# Patient Record
Sex: MALE | Race: WHITE | Employment: FULL TIME | URBAN - METROPOLITAN AREA
[De-identification: names, ages, dates, MRNs, and addresses within clinical notes are randomized per-mention and may not be internally consistent; named-entity substitution may affect disease eponyms.]

---

## 2017-09-30 PROCEDURE — 81001 URINALYSIS AUTO W/SCOPE: CPT | Performed by: FAMILY MEDICINE

## 2017-10-02 PROBLEM — R31.29 MICROSCOPIC HEMATURIA: Status: ACTIVE | Noted: 2017-10-02

## 2018-10-30 PROBLEM — F90.0 ADHD, PREDOMINANTLY INATTENTIVE TYPE: Status: ACTIVE | Noted: 2018-10-30

## 2019-04-03 PROBLEM — M18.12 PRIMARY OSTEOARTHRITIS OF FIRST CARPOMETACARPAL JOINT OF LEFT HAND: Status: ACTIVE | Noted: 2019-04-03

## 2021-10-25 PROBLEM — R31.29 MICROSCOPIC HEMATURIA: Status: RESOLVED | Noted: 2017-10-02 | Resolved: 2021-10-25

## 2021-10-25 PROBLEM — M18.12 PRIMARY OSTEOARTHRITIS OF FIRST CARPOMETACARPAL JOINT OF LEFT HAND: Status: RESOLVED | Noted: 2019-04-03 | Resolved: 2021-10-25

## 2024-02-10 ENCOUNTER — ANESTHESIA (OUTPATIENT)
Dept: SURGERY | Facility: HOSPITAL | Age: 49
End: 2024-02-10
Payer: COMMERCIAL

## 2024-02-10 ENCOUNTER — APPOINTMENT (OUTPATIENT)
Dept: CT IMAGING | Facility: HOSPITAL | Age: 49
End: 2024-02-10
Attending: EMERGENCY MEDICINE
Payer: COMMERCIAL

## 2024-02-10 ENCOUNTER — HOSPITAL ENCOUNTER (INPATIENT)
Facility: HOSPITAL | Age: 49
LOS: 1 days | Discharge: HOME | End: 2024-02-11
Attending: EMERGENCY MEDICINE | Admitting: INTERNAL MEDICINE
Payer: COMMERCIAL

## 2024-02-10 ENCOUNTER — ANESTHESIA EVENT (OUTPATIENT)
Dept: SURGERY | Facility: HOSPITAL | Age: 49
End: 2024-02-10
Payer: COMMERCIAL

## 2024-02-10 DIAGNOSIS — K81.0 ACUTE CHOLECYSTITIS: Primary | ICD-10-CM

## 2024-02-10 DIAGNOSIS — K81.9 CHOLECYSTITIS: ICD-10-CM

## 2024-02-10 LAB
ALBUMIN SERPL-MCNC: 3.7 G/DL (ref 3.4–5)
ALBUMIN/GLOB SERPL: 1 {RATIO} (ref 1–2)
ALP LIVER SERPL-CCNC: 85 U/L
ALT SERPL-CCNC: 17 U/L
ANION GAP SERPL CALC-SCNC: 5 MMOL/L (ref 0–18)
AST SERPL-CCNC: 12 U/L (ref 15–37)
BASOPHILS # BLD AUTO: 0.04 X10(3) UL (ref 0–0.2)
BASOPHILS NFR BLD AUTO: 0.3 %
BILIRUB SERPL-MCNC: 1.3 MG/DL (ref 0.1–2)
BILIRUB UR QL STRIP.AUTO: NEGATIVE
BUN BLD-MCNC: 13 MG/DL (ref 9–23)
CALCIUM BLD-MCNC: 9.1 MG/DL (ref 8.5–10.1)
CHLORIDE SERPL-SCNC: 106 MMOL/L (ref 98–112)
CLARITY UR REFRACT.AUTO: CLEAR
CO2 SERPL-SCNC: 29 MMOL/L (ref 21–32)
COLOR UR AUTO: YELLOW
CREAT BLD-MCNC: 0.79 MG/DL
EGFRCR SERPLBLD CKD-EPI 2021: 110 ML/MIN/1.73M2 (ref 60–?)
EOSINOPHIL # BLD AUTO: 0.06 X10(3) UL (ref 0–0.7)
EOSINOPHIL NFR BLD AUTO: 0.4 %
ERYTHROCYTE [DISTWIDTH] IN BLOOD BY AUTOMATED COUNT: 12.8 %
GLOBULIN PLAS-MCNC: 3.6 G/DL (ref 2.8–4.4)
GLUCOSE BLD-MCNC: 98 MG/DL (ref 70–99)
GLUCOSE UR STRIP.AUTO-MCNC: NORMAL MG/DL
HCT VFR BLD AUTO: 44.4 %
HGB BLD-MCNC: 14.7 G/DL
IMM GRANULOCYTES # BLD AUTO: 0.05 X10(3) UL (ref 0–1)
IMM GRANULOCYTES NFR BLD: 0.4 %
KETONES UR STRIP.AUTO-MCNC: NEGATIVE MG/DL
LEUKOCYTE ESTERASE UR QL STRIP.AUTO: NEGATIVE
LIPASE SERPL-CCNC: 34 U/L (ref 13–75)
LYMPHOCYTES # BLD AUTO: 1.69 X10(3) UL (ref 1–4)
LYMPHOCYTES NFR BLD AUTO: 12.6 %
MCH RBC QN AUTO: 27.7 PG (ref 26–34)
MCHC RBC AUTO-ENTMCNC: 33.1 G/DL (ref 31–37)
MCV RBC AUTO: 83.8 FL
MONOCYTES # BLD AUTO: 1.4 X10(3) UL (ref 0.1–1)
MONOCYTES NFR BLD AUTO: 10.4 %
NEUTROPHILS # BLD AUTO: 10.18 X10 (3) UL (ref 1.5–7.7)
NEUTROPHILS # BLD AUTO: 10.18 X10(3) UL (ref 1.5–7.7)
NEUTROPHILS NFR BLD AUTO: 75.9 %
NITRITE UR QL STRIP.AUTO: NEGATIVE
OSMOLALITY SERPL CALC.SUM OF ELEC: 290 MOSM/KG (ref 275–295)
PH UR STRIP.AUTO: 6 [PH] (ref 5–8)
PLATELET # BLD AUTO: 374 10(3)UL (ref 150–450)
POTASSIUM SERPL-SCNC: 3.8 MMOL/L (ref 3.5–5.1)
PROT SERPL-MCNC: 7.3 G/DL (ref 6.4–8.2)
PROT UR STRIP.AUTO-MCNC: 30 MG/DL
RBC # BLD AUTO: 5.3 X10(6)UL
RBC #/AREA URNS AUTO: >10 /HPF
SODIUM SERPL-SCNC: 140 MMOL/L (ref 136–145)
SP GR UR STRIP.AUTO: 1.02 (ref 1–1.03)
UROBILINOGEN UR STRIP.AUTO-MCNC: 2 MG/DL
WBC # BLD AUTO: 13.4 X10(3) UL (ref 4–11)

## 2024-02-10 PROCEDURE — 0FT44ZZ RESECTION OF GALLBLADDER, PERCUTANEOUS ENDOSCOPIC APPROACH: ICD-10-PCS | Performed by: SURGERY

## 2024-02-10 PROCEDURE — 88341 IMHCHEM/IMCYTCHM EA ADD ANTB: CPT | Performed by: SURGERY

## 2024-02-10 PROCEDURE — 81001 URINALYSIS AUTO W/SCOPE: CPT | Performed by: EMERGENCY MEDICINE

## 2024-02-10 PROCEDURE — 96361 HYDRATE IV INFUSION ADD-ON: CPT

## 2024-02-10 PROCEDURE — 99285 EMERGENCY DEPT VISIT HI MDM: CPT

## 2024-02-10 PROCEDURE — 80053 COMPREHEN METABOLIC PANEL: CPT | Performed by: EMERGENCY MEDICINE

## 2024-02-10 PROCEDURE — 88304 TISSUE EXAM BY PATHOLOGIST: CPT | Performed by: SURGERY

## 2024-02-10 PROCEDURE — 74177 CT ABD & PELVIS W/CONTRAST: CPT | Performed by: EMERGENCY MEDICINE

## 2024-02-10 PROCEDURE — 96365 THER/PROPH/DIAG IV INF INIT: CPT

## 2024-02-10 PROCEDURE — 85025 COMPLETE CBC W/AUTO DIFF WBC: CPT | Performed by: EMERGENCY MEDICINE

## 2024-02-10 PROCEDURE — 8E0W4CZ ROBOTIC ASSISTED PROCEDURE OF TRUNK REGION, PERCUTANEOUS ENDOSCOPIC APPROACH: ICD-10-PCS | Performed by: SURGERY

## 2024-02-10 PROCEDURE — 83690 ASSAY OF LIPASE: CPT | Performed by: EMERGENCY MEDICINE

## 2024-02-10 PROCEDURE — 88342 IMHCHEM/IMCYTCHM 1ST ANTB: CPT | Performed by: SURGERY

## 2024-02-10 PROCEDURE — 96375 TX/PRO/DX INJ NEW DRUG ADDON: CPT

## 2024-02-10 RX ORDER — SODIUM CHLORIDE 9 MG/ML
INJECTION, SOLUTION INTRAVENOUS CONTINUOUS
Status: ACTIVE | OUTPATIENT
Start: 2024-02-10 | End: 2024-02-10

## 2024-02-10 RX ORDER — IBUPROFEN 400 MG/1
400 TABLET ORAL EVERY 6 HOURS PRN
COMMUNITY

## 2024-02-10 RX ORDER — KETOROLAC TROMETHAMINE 30 MG/ML
INJECTION, SOLUTION INTRAMUSCULAR; INTRAVENOUS AS NEEDED
Status: DISCONTINUED | OUTPATIENT
Start: 2024-02-10 | End: 2024-02-10 | Stop reason: SURG

## 2024-02-10 RX ORDER — MIDAZOLAM HYDROCHLORIDE 1 MG/ML
INJECTION INTRAMUSCULAR; INTRAVENOUS AS NEEDED
Status: DISCONTINUED | OUTPATIENT
Start: 2024-02-10 | End: 2024-02-10 | Stop reason: SURG

## 2024-02-10 RX ORDER — INDOCYANINE GREEN AND WATER 25 MG
5 KIT INJECTION ONCE
Status: COMPLETED | OUTPATIENT
Start: 2024-02-10 | End: 2024-02-10

## 2024-02-10 RX ORDER — DEXAMETHASONE SODIUM PHOSPHATE 4 MG/ML
VIAL (ML) INJECTION AS NEEDED
Status: DISCONTINUED | OUTPATIENT
Start: 2024-02-10 | End: 2024-02-10 | Stop reason: SURG

## 2024-02-10 RX ORDER — NALOXONE HYDROCHLORIDE 0.4 MG/ML
0.08 INJECTION, SOLUTION INTRAMUSCULAR; INTRAVENOUS; SUBCUTANEOUS AS NEEDED
Status: DISCONTINUED | OUTPATIENT
Start: 2024-02-10 | End: 2024-02-10 | Stop reason: HOSPADM

## 2024-02-10 RX ORDER — TRANEXAMIC ACID 10 MG/ML
INJECTION, SOLUTION INTRAVENOUS AS NEEDED
Status: DISCONTINUED | OUTPATIENT
Start: 2024-02-10 | End: 2024-02-10 | Stop reason: SURG

## 2024-02-10 RX ORDER — LIDOCAINE HYDROCHLORIDE 10 MG/ML
INJECTION, SOLUTION INFILTRATION; PERINEURAL AS NEEDED
Status: DISCONTINUED | OUTPATIENT
Start: 2024-02-10 | End: 2024-02-10 | Stop reason: HOSPADM

## 2024-02-10 RX ORDER — BUPIVACAINE HYDROCHLORIDE 5 MG/ML
INJECTION, SOLUTION EPIDURAL; INTRACAUDAL AS NEEDED
Status: DISCONTINUED | OUTPATIENT
Start: 2024-02-10 | End: 2024-02-10 | Stop reason: HOSPADM

## 2024-02-10 RX ORDER — ALBUTEROL SULFATE 2.5 MG/3ML
2.5 SOLUTION RESPIRATORY (INHALATION) AS NEEDED
Status: DISCONTINUED | OUTPATIENT
Start: 2024-02-10 | End: 2024-02-10 | Stop reason: HOSPADM

## 2024-02-10 RX ORDER — SODIUM CHLORIDE, SODIUM LACTATE, POTASSIUM CHLORIDE, CALCIUM CHLORIDE 600; 310; 30; 20 MG/100ML; MG/100ML; MG/100ML; MG/100ML
INJECTION, SOLUTION INTRAVENOUS CONTINUOUS
Status: DISCONTINUED | OUTPATIENT
Start: 2024-02-10 | End: 2024-02-10 | Stop reason: HOSPADM

## 2024-02-10 RX ORDER — LIDOCAINE HYDROCHLORIDE 10 MG/ML
INJECTION, SOLUTION EPIDURAL; INFILTRATION; INTRACAUDAL; PERINEURAL AS NEEDED
Status: DISCONTINUED | OUTPATIENT
Start: 2024-02-10 | End: 2024-02-10 | Stop reason: SURG

## 2024-02-10 RX ORDER — HYDROMORPHONE HYDROCHLORIDE 1 MG/ML
0.2 INJECTION, SOLUTION INTRAMUSCULAR; INTRAVENOUS; SUBCUTANEOUS EVERY 5 MIN PRN
Status: DISCONTINUED | OUTPATIENT
Start: 2024-02-10 | End: 2024-02-10 | Stop reason: HOSPADM

## 2024-02-10 RX ORDER — POLYETHYLENE GLYCOL 3350 17 G/17G
17 POWDER, FOR SOLUTION ORAL DAILY PRN
Status: DISCONTINUED | OUTPATIENT
Start: 2024-02-10 | End: 2024-02-11

## 2024-02-10 RX ORDER — HYDROCODONE BITARTRATE AND ACETAMINOPHEN 5; 325 MG/1; MG/1
2 TABLET ORAL ONCE AS NEEDED
Status: DISCONTINUED | OUTPATIENT
Start: 2024-02-10 | End: 2024-02-10 | Stop reason: HOSPADM

## 2024-02-10 RX ORDER — HYDROMORPHONE HYDROCHLORIDE 1 MG/ML
0.4 INJECTION, SOLUTION INTRAMUSCULAR; INTRAVENOUS; SUBCUTANEOUS EVERY 5 MIN PRN
Status: DISCONTINUED | OUTPATIENT
Start: 2024-02-10 | End: 2024-02-10 | Stop reason: HOSPADM

## 2024-02-10 RX ORDER — HYDROMORPHONE HYDROCHLORIDE 1 MG/ML
0.6 INJECTION, SOLUTION INTRAMUSCULAR; INTRAVENOUS; SUBCUTANEOUS EVERY 5 MIN PRN
Status: DISCONTINUED | OUTPATIENT
Start: 2024-02-10 | End: 2024-02-10 | Stop reason: HOSPADM

## 2024-02-10 RX ORDER — BUPROPION HYDROCHLORIDE 300 MG/1
300 TABLET ORAL DAILY
COMMUNITY
Start: 2024-02-03

## 2024-02-10 RX ORDER — PHENYLEPHRINE HCL 10 MG/ML
VIAL (ML) INJECTION AS NEEDED
Status: DISCONTINUED | OUTPATIENT
Start: 2024-02-10 | End: 2024-02-10 | Stop reason: SURG

## 2024-02-10 RX ORDER — ONDANSETRON 2 MG/ML
4 INJECTION INTRAMUSCULAR; INTRAVENOUS EVERY 6 HOURS PRN
Status: DISCONTINUED | OUTPATIENT
Start: 2024-02-10 | End: 2024-02-10 | Stop reason: HOSPADM

## 2024-02-10 RX ORDER — SODIUM CHLORIDE 9 MG/ML
1000 INJECTION, SOLUTION INTRAVENOUS ONCE
Status: COMPLETED | OUTPATIENT
Start: 2024-02-10 | End: 2024-02-10

## 2024-02-10 RX ORDER — SERTRALINE HYDROCHLORIDE 100 MG/1
100 TABLET, FILM COATED ORAL NIGHTLY
COMMUNITY
Start: 2023-12-26

## 2024-02-10 RX ORDER — ONDANSETRON 2 MG/ML
INJECTION INTRAMUSCULAR; INTRAVENOUS AS NEEDED
Status: DISCONTINUED | OUTPATIENT
Start: 2024-02-10 | End: 2024-02-10 | Stop reason: SURG

## 2024-02-10 RX ORDER — ROCURONIUM BROMIDE 10 MG/ML
INJECTION, SOLUTION INTRAVENOUS AS NEEDED
Status: DISCONTINUED | OUTPATIENT
Start: 2024-02-10 | End: 2024-02-10 | Stop reason: SURG

## 2024-02-10 RX ORDER — ONDANSETRON 2 MG/ML
4 INJECTION INTRAMUSCULAR; INTRAVENOUS EVERY 6 HOURS PRN
Status: DISCONTINUED | OUTPATIENT
Start: 2024-02-10 | End: 2024-02-11

## 2024-02-10 RX ORDER — ONDANSETRON 2 MG/ML
4 INJECTION INTRAMUSCULAR; INTRAVENOUS EVERY 4 HOURS PRN
Status: DISCONTINUED | OUTPATIENT
Start: 2024-02-10 | End: 2024-02-10

## 2024-02-10 RX ORDER — ENEMA 19; 7 G/133ML; G/133ML
1 ENEMA RECTAL ONCE AS NEEDED
Status: DISCONTINUED | OUTPATIENT
Start: 2024-02-10 | End: 2024-02-11

## 2024-02-10 RX ORDER — LABETALOL HYDROCHLORIDE 5 MG/ML
5 INJECTION, SOLUTION INTRAVENOUS EVERY 5 MIN PRN
Status: DISCONTINUED | OUTPATIENT
Start: 2024-02-10 | End: 2024-02-10 | Stop reason: HOSPADM

## 2024-02-10 RX ORDER — KETOROLAC TROMETHAMINE 30 MG/ML
30 INJECTION, SOLUTION INTRAMUSCULAR; INTRAVENOUS ONCE
Status: COMPLETED | OUTPATIENT
Start: 2024-02-10 | End: 2024-02-10

## 2024-02-10 RX ORDER — DEXTROSE MONOHYDRATE, SODIUM CHLORIDE, AND POTASSIUM CHLORIDE 50; 1.49; 4.5 G/1000ML; G/1000ML; G/1000ML
INJECTION, SOLUTION INTRAVENOUS CONTINUOUS
Status: DISCONTINUED | OUTPATIENT
Start: 2024-02-10 | End: 2024-02-11

## 2024-02-10 RX ORDER — KETOROLAC TROMETHAMINE 30 MG/ML
30 INJECTION, SOLUTION INTRAMUSCULAR; INTRAVENOUS EVERY 6 HOURS PRN
Status: DISCONTINUED | OUTPATIENT
Start: 2024-02-10 | End: 2024-02-11

## 2024-02-10 RX ORDER — SENNOSIDES 8.6 MG
17.2 TABLET ORAL NIGHTLY PRN
Status: DISCONTINUED | OUTPATIENT
Start: 2024-02-10 | End: 2024-02-11

## 2024-02-10 RX ORDER — ACETAMINOPHEN 500 MG
1000 TABLET ORAL ONCE AS NEEDED
Status: DISCONTINUED | OUTPATIENT
Start: 2024-02-10 | End: 2024-02-10 | Stop reason: HOSPADM

## 2024-02-10 RX ORDER — BISACODYL 10 MG
10 SUPPOSITORY, RECTAL RECTAL
Status: DISCONTINUED | OUTPATIENT
Start: 2024-02-10 | End: 2024-02-11

## 2024-02-10 RX ORDER — HYDROCODONE BITARTRATE AND ACETAMINOPHEN 5; 325 MG/1; MG/1
1 TABLET ORAL ONCE AS NEEDED
Status: DISCONTINUED | OUTPATIENT
Start: 2024-02-10 | End: 2024-02-10 | Stop reason: HOSPADM

## 2024-02-10 RX ORDER — PROCHLORPERAZINE EDISYLATE 5 MG/ML
5 INJECTION INTRAMUSCULAR; INTRAVENOUS EVERY 8 HOURS PRN
Status: DISCONTINUED | OUTPATIENT
Start: 2024-02-10 | End: 2024-02-11

## 2024-02-10 RX ADMIN — PHENYLEPHRINE HCL 50 MCG: 10 MG/ML VIAL (ML) INJECTION at 15:58:00

## 2024-02-10 RX ADMIN — LIDOCAINE HYDROCHLORIDE 100 MG: 10 INJECTION, SOLUTION EPIDURAL; INFILTRATION; INTRACAUDAL; PERINEURAL at 15:53:00

## 2024-02-10 RX ADMIN — SODIUM CHLORIDE, SODIUM LACTATE, POTASSIUM CHLORIDE, CALCIUM CHLORIDE: 600; 310; 30; 20 INJECTION, SOLUTION INTRAVENOUS at 15:51:00

## 2024-02-10 RX ADMIN — PHENYLEPHRINE HCL 50 MCG: 10 MG/ML VIAL (ML) INJECTION at 16:02:00

## 2024-02-10 RX ADMIN — MIDAZOLAM HYDROCHLORIDE 2 MG: 1 INJECTION INTRAMUSCULAR; INTRAVENOUS at 15:50:00

## 2024-02-10 RX ADMIN — KETOROLAC TROMETHAMINE 30 MG: 30 INJECTION, SOLUTION INTRAMUSCULAR; INTRAVENOUS at 16:56:00

## 2024-02-10 RX ADMIN — DEXAMETHASONE SODIUM PHOSPHATE 4 MG: 4 MG/ML VIAL (ML) INJECTION at 16:07:00

## 2024-02-10 RX ADMIN — ONDANSETRON 4 MG: 2 INJECTION INTRAMUSCULAR; INTRAVENOUS at 16:26:00

## 2024-02-10 RX ADMIN — ROCURONIUM BROMIDE 50 MG: 10 INJECTION, SOLUTION INTRAVENOUS at 15:53:00

## 2024-02-10 RX ADMIN — TRANEXAMIC ACID 1000 MG: 10 INJECTION, SOLUTION INTRAVENOUS at 16:56:00

## 2024-02-10 NOTE — OPERATIVE REPORT
[facility]                                                         Operative Note    Moshe Nagy Location: [facility] Perioperative   CSN [id number] MRN [id number]   Admission Date 2/10/2024 Procedure Date 2/10/2024   Attending Physician Jonn Christian MD Procedure Physician Jonn Christian MD     Pre-Operative Diagnosis: Cholecystitis [K81.9]     Post-Operative Diagnosis: Cholecystitis [K81.9]    Procedure Performed: XI ROBOT-ASSISTED LAPAROSCOPIC CHOLECYSTECTOMY    Surgeon: Jonn Christian MD     Assistant(s):  Surgical Assistant.: Apolonia Ferguson    The surgical Assistant was necessary for this procedure.  The assistant was involved in patient positioning, instrument exchange, improving exposure optimizing visualization of patient's safety, and closure.  The duties of the assistant allowed for reduced risk of the performance of this procedure and care of this patient         Anesthesia: General       Complications: none       Estimated Blood Loss: Blood Output: 20 mL (2/10/2024  4:52 PM)              Operative Summary:   Patient was taken to the operating room placed in a supine position.  They were prepped and draped in usual fashion after being placed under general anesthesia.  A Veress needle was inserted above the umbilicus and the abdomen was insufflated 15 mm of carbon dioxide.  An 8 mm trocar was placed just above the umbilicus and 2 additional 8 mm trochars were placed in the left and right quadrants.  A 5 mm assistant port was placed in the lower right quadrant of the abdomen.  Patient was positioned.  The XI da Christopher robot was brought into position and docked.  Instrumentation was placed.  The gallbladder was markedly inflamed and abnormal.  The wall was thickened and there was severe acute cholecystitis present.  The gallbladder required drainage for grasping.  The gallbladder was grasped and retracted cephalad and laterally.  The critical view was obtained.  Firefly was utilized for ductal anatomy.   The cystic duct was seen coursing into the gallbladder.  This was dissected out from its surrounding tissues.  This was followed by Endo clipping it twice on the proximal side and once on the distal side dividing it.  The cystic artery was seen posterior to the duct and endoclipped proximally distally and divided.  The gallbladder was then excised from the gallbladder fossa.  Hemostasis is achieved.  Firefly was utilized again to inspect the liver bed.  The gallbladder was placed in an Endobag and retrieved from the abdominal cavity.  A bilateral transabdominis plane block was performed by infusing 20 cc of a mixture of half percent Marcaine 1% Xylocaine in the transabdominis plane bilaterally.  This was done under direct laparoscopic view.  The trochars were removed.  Wounds were closed.  Dermabond was placed on the skin.  The patient was awoken and taken to the recovery room in satisfactory condition          Jonn Christian MD  2/10/2024  4:55 PM

## 2024-02-10 NOTE — ANESTHESIA PROCEDURE NOTES
Airway  Date/Time: 2/10/2024 3:55 PM  Urgency: elective      General Information and Staff    Patient location during procedure: OR  Anesthesiologist: Elizabeth Schneider MD  Performed: anesthesiologist   Performed by: Elizabeth Schneider MD  Authorized by: Elizabeth Schneider MD      Indications and Patient Condition  Indications for airway management: anesthesia  Sedation level: deep  Preoxygenated: yes  Patient position: sniffing  Mask difficulty assessment: 1 - vent by mask    Final Airway Details  Final airway type: endotracheal airway      Successful airway: ETT  Cuffed: yes   Successful intubation technique: direct laryngoscopy  Endotracheal tube insertion site: oral  Blade: Abdulaziz  Blade size: #3  ETT size (mm): 7.5    Cormack-Lehane Classification: grade IIB - view of arytenoids or posterior of glottis only  Placement verified by: capnometry   Measured from: lips  ETT to lips (cm): 24  Number of attempts at approach: 1

## 2024-02-10 NOTE — CONSULTS
[facility]  Report of Consultation    Moshe Nagy Patient Status:  Emergency    10/13/1975 MRN [id number]   Location [facility] PRE OP HOLDING Attending Jonn Christian MD   Hosp Day # 0 PCP Gil Garrison MD     Reason for Consultation:  Abdominal pain    History of Present Illness:  Moshe Nagy is a a(n) 48 year old male. 2 day history of worsening RUQ pain. Workup in ER revealed cholecystitis    History:  Past Medical History:   Diagnosis Date    ADHD, predominantly inattentive type     Anxiety     Cholelithiasis 2018    Depression     Elevated liver function tests     Insomnia     Microhematuria 2017    Mood swings     SEASONAL ALLERGIES      Past Surgical History:   Procedure Laterality Date    HERNIA SURGERY Left 2018    inguinal    IMPACT TOOTH REM BONY W/COMP Bilateral     wisdom teeth x 4    VASECTOMY Bilateral 2016     Family History   Problem Relation Age of Onset    Hypertension Father     High Cholesterol Father     Breast Cancer Maternal Grandmother     Alcohol and Other Disorders Associated Maternal Grandfather     Lung Disorder Maternal Grandfather         COPD    Other (Other) Maternal Grandfather     Gastro-Intestinal Disorder Paternal Grandmother         cholecystectomy    Alcohol and Other Disorders Associated Paternal Grandfather     Heart Disease Paternal Grandfather     Substance Abuse Paternal Grandfather     Hypertension Sister     Psychiatric Sister         stress    Thyroid Disorder Mother     Breast Cancer Mother       reports that he quit smoking about 14 years ago. His smoking use included cigarettes. He has a 10.5 pack-year smoking history. He has never used smokeless tobacco. He reports that he does not drink alcohol and does not use drugs.    Allergies:  No Known Allergies    Medications:  No current facility-administered medications for this encounter.    Review of Systems:    GENERAL HEALTH: otherwise feels well, no weight loss, no fever or  chills  SKIN: denies any unusual skin rashes or jaundice  HEENT: denies nasal congestion, sinus pain or sore throat; hearing loss negative  RESPIRATORY: denies shortness of breath, wheezing or cough   CARDIOVASCULAR: denies chest pain or RODRIGUEZ; no palpitations   GI: denies nausea, vomiting, constipation, diarrhea; no rectal bleeding; no heartburn  GENITAL/: no dysuria, urgency or frequency, no tea colored urine  MUSCULOSKELETAL: no joint complaints upper or lower extremities  HEMATOLOGY: denies hx anemia; denies bruising or excessive bleeding    Physical Exam:  Blood pressure (!) 133/92, pulse 102, temperature 98.6 °F (37 °C), temperature source Temporal, resp. rate 16, height 5' 9\" (1.753 m), weight 155 lb (70.3 kg), SpO2 99%.    General: Alert, orientated x3.  Cooperative.  No apparent distress.  HEENT: Exam is unremarkable.  Without scleral icterus.  Mucous membranes are moist. Oropharynx is clear.  Lungs: Clear to auscultation bilaterally.  Cardiac: Regular rate and rhythm. No murmur.  Abdomen:  soft, tender RUQ  Extremities:  No lower extremity edema noted.  Without clubbing or cyanosis.    Skin: Normal texture and turgor.  Neurologic: Cranial nerves are grossly intact.  Motor strength and sensory examination is grossly normal.  No focal neurologic deficit.    Laboratory Data:  Lab Results   Component Value Date    WBC 13.4 02/10/2024    HGB 14.7 02/10/2024    HCT 44.4 02/10/2024    .0 02/10/2024    CREATSERUM 0.79 02/10/2024    BUN 13 02/10/2024     02/10/2024    K 3.8 02/10/2024     02/10/2024    CO2 29.0 02/10/2024    GLU 98 02/10/2024    CA 9.1 02/10/2024    ALB 3.7 02/10/2024    ALKPHO 85 02/10/2024    BILT 1.3 02/10/2024    TP 7.3 02/10/2024    AST 12 02/10/2024    ALT 17 02/10/2024    LIP 34 02/10/2024       Impression and Plan:  Acute cholecystitis with cholelithiasis    I have recommended a ROBOTIC ASSISTED laparoscopic cholecystectomy. The risks benefits and alternatives of this  approach were explained to the patient in detail. We discussed the risk of bleeding, infection, need to convert to an open operation, bile leak, and injury to the surrounding structures. Despite all the risk patient wished to proceed with surgery.      Jonn Christian MD  2/10/2024  3:40 PM

## 2024-02-10 NOTE — ANESTHESIA POSTPROCEDURE EVALUATION
[facility]    Moshe Nagy Patient Status:  Emergency   Age/Gender 48 year old male MRN [id number]   Location [facility] POST ANESTHESIA CARE UNIT Attending Jonn Christian MD   Hosp Day # 0 PCP Gil Garrison MD       Anesthesia Post-op Note    XI ROBOT-ASSISTED LAPAROSCOPIC CHOLECYSTECTOMY    Procedure Summary       Date: 02/10/24 Room / Location:  MAIN OR 08 / [facility] MAIN OR    Anesthesia Start: 1551 Anesthesia Stop: 1713    Procedure: XI ROBOT-ASSISTED LAPAROSCOPIC CHOLECYSTECTOMY (Abdomen) Diagnosis:       Cholecystitis      (Cholecystitis [K81.9])    Surgeons: Jonn Christian MD Anesthesiologist: Elizabeth Schneider MD    Anesthesia Type: general ASA Status: 2            Anesthesia Type: general    Vitals Value Taken Time   /83 02/10/24 1717   Temp 97.9 02/10/24 1718   Pulse 82 02/10/24 1718   Resp 14 02/10/24 1718   SpO2 91 % 02/10/24 1718   Vitals shown include unfiled device data.    Patient Location: PACU    Anesthesia Type: general    Airway Patency: patent, oral/nasal airway and extubated    Postop Pain Control: adequate    Nausea/Vomiting: none    Cardiopulmonary/Hydration status: stable euvolemic    Complications: no apparent anesthesia related complications    Postop vital signs: stable    Dental Exam: Unchanged from Preop    Patient to be discharged from PACU when criteria met.

## 2024-02-10 NOTE — ANESTHESIA PREPROCEDURE EVALUATION
PRE-OP EVALUATION    Patient Name: Moshe Nagy    Admit Diagnosis: No admission diagnoses are documented for this encounter.    Pre-op Diagnosis: Cholecystitis [K81.9]    XI ROBOT-ASSISTED LAPAROSCOPIC CHOLECYSTECTOMY POSS OPEN    Anesthesia Procedure: XI ROBOT-ASSISTED LAPAROSCOPIC CHOLECYSTECTOMY POSS OPEN    Surgeon(s) and Role:     * Jonn Christian MD - Primary    Pre-op vitals reviewed.  Temp: 98.6 °F (37 °C)  Pulse: 102  Resp: 16  BP: 133/92  SpO2: 99 %  Body mass index is 22.89 kg/m².    Current medications reviewed.  Hospital Medications:   [COMPLETED] ketorolac (Toradol) 30 MG/ML injection 30 mg  30 mg Intravenous Once    [COMPLETED] sodium chloride 0.9% infusion 1,000 mL  1,000 mL Intravenous Once    [COMPLETED] iopamidol 76% (ISOVUE-370) injection for power injector  85 mL Intravenous ONCE PRN    [COMPLETED] sodium chloride 0.9% infusion 1,000 mL  1,000 mL Intravenous Once    [COMPLETED] piperacillin-tazobactam (Zosyn) 4.5 g in dextrose 5% 100 mL IVPB-ADDV  4.5 g Intravenous Once    [COMPLETED] indocyanine green (IC-Green) injection 5 mg  5 mg Intravenous Once       Outpatient Medications:   (Not in a hospital admission)      Allergies: Patient has no known allergies.      Anesthesia Evaluation        Anesthetic Complications           GI/Hepatic/Renal  Comment: Cholelithiasis.                                Cardiovascular    Negative cardiovascular ROS.    Exercise tolerance: good     MET: >4                                           Endo/Other                                  Pulmonary  Comment: Former smoker.   Negative pulmonary ROS.                       Neuro/Psych  Comment: ADHD    (+) depression                        CT of abdomen:  CONCLUSION:       1. Cholelithiasis with mild gallbladder wall thickening, gallbladder distension, and minimal pericholecystic fluid is concerning for changes of early acute cholecystitis.  Clinical correlation recommended.  Dedicated abdominal ultrasound may  also be of   further value.  Nonspecific haziness in the right-sided omentum may be due to the edema from this inflammatory process.      2. Left nephrolithiasis.  No obstructive uropathy.             Past Surgical History:   Procedure Laterality Date    HERNIA SURGERY Left 2018    inguinal    IMPACT TOOTH REM BONY W/COMP Bilateral 1994    wisdom teeth x 4    VASECTOMY Bilateral 2016     Social History     Socioeconomic History    Marital status:      Spouse name: Jina    Number of children: 2    Years of education: 16   Occupational History    Occupation:      Comment: [employer]   Tobacco Use    Smoking status: Former     Packs/day: 0.75     Years: 14.00     Additional pack years: 0.00     Total pack years: 10.50     Types: Cigarettes     Quit date: 3/31/2009     Years since quittin.8    Smokeless tobacco: Never   Vaping Use    Vaping Use: Never used   Substance and Sexual Activity    Alcohol use: No    Drug use: No    Sexual activity: Yes     Partners: Female   Other Topics Concern     Service No    Blood Transfusions No    Caffeine Concern Yes     Comment: cola 1/day    Occupational Exposure No    Hobby Hazards No    Sleep Concern No    Stress Concern No    Weight Concern No    Special Diet No    Back Care No    Exercise Yes     Comment: walks 3 mile/day    Bike Helmet No    Seat Belt Yes    Self-Exams Yes     History   Drug Use No     Available pre-op labs reviewed.  Lab Results   Component Value Date    WBC 13.4 (H) 02/10/2024    RBC 5.30 02/10/2024    HGB 14.7 02/10/2024    HCT 44.4 02/10/2024    MCV 83.8 02/10/2024    MCH 27.7 02/10/2024    MCHC 33.1 02/10/2024    RDW 12.8 02/10/2024    .0 02/10/2024     Lab Results   Component Value Date     02/10/2024    K 3.8 02/10/2024     02/10/2024    CO2 29.0 02/10/2024    BUN 13 02/10/2024    CREATSERUM 0.79 02/10/2024    GLU 98 02/10/2024    CA 9.1 02/10/2024            Airway      Mallampati:  III  Mouth opening: 3 FB  TM distance: > 6 cm  Neck ROM: full Cardiovascular      Rhythm: regular  Rate: normal     Dental    Dentition appears grossly intact         Pulmonary    Pulmonary exam normal.                 Other findings              ASA: 2   Plan: general  NPO status verified and patient meets guidelines.    Post-procedure pain management plan discussed with surgeon and patient.      Plan/risks discussed with: patient  Use of blood product(s) discussed with: patient    Consented to blood products.          Present on Admission:  **None**

## 2024-02-10 NOTE — H&P
[facility] History and Physical      Chief Complaint   Patient presents with    Abdomen/Flank Pain        PCP: Gil Garrison MD      History of Present Illness: Patient is a 48 year old male with PMH sig for anziety, adhd presents for eval of abdominal pain.  Sx start mid week.  Rt sided.      Thought he was constipated and took bowel meds.  Didn't help.  Pain worsened came in.     CT w cholecystitis.      Past Medical History:   Diagnosis Date    ADHD, predominantly inattentive type     Anxiety     Cholelithiasis 2018    Depression     Elevated liver function tests     Insomnia     Microhematuria 2017    Mood swings     SEASONAL ALLERGIES       Past Surgical History:   Procedure Laterality Date    HERNIA SURGERY Left 2018    inguinal    IMPACT TOOTH REM BONY W/COMP Bilateral     wisdom teeth x 4    VASECTOMY Bilateral 2016        ALL:  No Known Allergies     No current outpatient medications on file.       Social History     Tobacco Use    Smoking status: Former     Packs/day: 0.75     Years: 14.00     Additional pack years: 0.00     Total pack years: 10.50     Types: Cigarettes     Quit date: 3/31/2009     Years since quittin.8    Smokeless tobacco: Never   Substance Use Topics    Alcohol use: No        Fam Hx  Family History   Problem Relation Age of Onset    Hypertension Father     High Cholesterol Father     Breast Cancer Maternal Grandmother     Alcohol and Other Disorders Associated Maternal Grandfather     Lung Disorder Maternal Grandfather         COPD    Other (Other) Maternal Grandfather     Gastro-Intestinal Disorder Paternal Grandmother         cholecystectomy    Alcohol and Other Disorders Associated Paternal Grandfather     Heart Disease Paternal Grandfather     Substance Abuse Paternal Grandfather     Hypertension Sister     Psychiatric Sister         stress    Thyroid Disorder Mother     Breast Cancer Mother        Review of Systems  Comprehensive ROS reviewed and  negative except for what is stated in HPI.      OBJECTIVE:  BP (!) 133/92   Pulse 102   Temp 98.6 °F (37 °C) (Temporal)   Resp 16   Ht 5' 9\" (1.753 m)   Wt 155 lb (70.3 kg)   SpO2 99%   BMI 22.89 kg/m²   General:  Alert, no distress, appears stated age.    Head:  Normocephalic, without obvious abnormality, atraumatic.   Eyes:  Sclera anicteric, No conjunctival pallor, EOMs intact.    Nose: Nares normal. Septum midline. Mucosa normal. No drainage.   Throat: Lips, mucosa, and tongue normal. Teeth and gums normal.   Neck: Supple, symmetrical, trachea midline, no cervical or supraclavicular lymph adenopathy, thyroid: no enlargment/tenderness/nodules appreciated   Lungs:   Clear to auscultation bilaterally. Normal effort   Chest wall:  No tenderness or deformity.   Heart:  Regular rate and rhythm, S1, S2 normal, no murmur, rub or gallop appreciated   Abdomen:   Soft, non-tender. Bowel sounds normal. No masses,  No organomegaly. Non distended   Extremities: Extremities normal, atraumatic, no cyanosis or edema.   Skin: Skin color, texture, turgor normal. No rashes or lesions.    Neurologic: Normal strength, no focal deficit appreciated     Data Review:    LABS:   Lab Results   Component Value Date    WBC 13.4 02/10/2024    HGB 14.7 02/10/2024    HCT 44.4 02/10/2024    .0 02/10/2024    CREATSERUM 0.79 02/10/2024    BUN 13 02/10/2024     02/10/2024    K 3.8 02/10/2024     02/10/2024    CO2 29.0 02/10/2024    GLU 98 02/10/2024    CA 9.1 02/10/2024    ALB 3.7 02/10/2024    ALKPHO 85 02/10/2024    BILT 1.3 02/10/2024    TP 7.3 02/10/2024    AST 12 02/10/2024    ALT 17 02/10/2024    LIP 34 02/10/2024       CXR: image personally reviewed.      Radiology: CT ABDOMEN+PELVIS(CONTRAST ONLY)(CPT=74177)    Result Date: 2/10/2024  PROCEDURE:  CT ABDOMEN+PELVIS (CONTRAST ONLY) (CPT=74177)  COMPARISON:  None.  INDICATIONS:  RUQ PAIN  TECHNIQUE:  CT scanning was performed from the dome of the diaphragm to the  pubic symphysis with non-ionic intravenous contrast material. Post contrast coronal MPR imaging was performed.  Dose reduction techniques were used. Dose information is transmitted to the ACR (American College of Radiology) NRDR (National Radiology Data Registry) which includes the Dose Index Registry.  PATIENT STATED HISTORY:(As transcribed by Technologist)  Patient with right upper quadrant pain since Thursday.    CONTRAST USED:  85cc of Isovue 370  FINDINGS: LUNG BASE:  Scattered scarring/atelectasis LIVER:  Unremarkable. BILIARY:  Cholelithiasis with mild gallbladder wall thickening, gallbladder distension, and minimal pericholecystic fluid is concerning for changes of early acute cholecystitis.  Clinical correlation recommended.  Dedicated abdominal ultrasound may also be of further value. SPLEEN:  Unremarkable. PANCREAS:  Unremarkable. ADRENALS:  Unremarkable. KIDNEYS:  2 mm nonobstructing stone in the lower pole left kidney.  Simple appearing exophytic cyst along the lower pole left kidney measuring up to 1.9 cm.  No obstructive uropathy. AORTA/VASCULAR:  Mild mixed plaque in the aorta and iliac arteries. RETROPERITONEUM:  Unremarkable. BOWEL/MESENTERY:  Small hiatal hernia.  Unremarkable appendix.  Moderate feces in the colon.  Nonspecific haziness in the right side of the omentum.  Mild uncomplicated colonic diverticulosis.  No large or small bowel dilatation.  No free air or free fluid. ABDOMINAL WALL:  Changes of previous left inguinal herniorrhaphy. PELVIC ORGANS:  Decompressed urinary bladder.  Prostate calcifications. LYMPH NODES:  No lymphadenopathy in the abdomen or pelvis. BONES:  Degenerative changes in the spine and hips. OTHER:  None.            CONCLUSION:   1. Cholelithiasis with mild gallbladder wall thickening, gallbladder distension, and minimal pericholecystic fluid is concerning for changes of early acute cholecystitis.  Clinical correlation recommended.  Dedicated abdominal ultrasound may  also be of further value.  Nonspecific haziness in the right-sided omentum may be due to the edema from this inflammatory process.  2. Left nephrolithiasis.  No obstructive uropathy.   Please see above for further details.   LOCATION:  [facility]   Dictated by (CST): Todd Kirkpatrick MD on 2/10/2024 at 1:26 PM     Finalized by (CST): Todd Kirkpatrick MD on 2/10/2024 at 1:29 PM          Assessment/Plan:       # acute cholecytitis  - plan lap sania today, IVF, NPO, pain control, abx        Outpatient records or previous hospital records reviewed.   [facility] hospitalist to continue to follow patient while in house      Howie [facility] MD  [facility] Hospitalist  [phone]

## 2024-02-10 NOTE — ED INITIAL ASSESSMENT (HPI)
Pt presents to the ED with c/o right sided abdominal pain since Tuesday. Pt reports concern for constipation so took gas x, stool softener and suppositiory and had normal BM last night. Pt reports pain has continued so went to IC and sent here for further evaluation. Pt awake and alert, skin w/d,resps reg/unlabored.

## 2024-02-10 NOTE — ED PROVIDER NOTES
Patient Seen in: [facility] Emergency Department      History     Chief Complaint   Patient presents with    Abdomen/Flank Pain     Stated Complaint: RUQ PAIN    Subjective:   HPI    Patient is a 48-year-old male with previous hernia surgery with a questionable history of some gallstones in the past as per medical records who presents emergency room with a history of some right-sided abdominal pain which has been ongoing since Tuesday, 4 days prior to arrival in the emergency room.  The patient states he was concerned he might be constipated took Gas-X and stool softener as well as a suppository yesterday and had normal bowel movement last night.  Patient continues to have pain to the right side of his abdomen at this time.  The patient was sent to the ER for further evaluation.  Patient denies history of any vomiting or fever.  The patient denies history of any other somatic complaints or discomfort at this time.  Patient denies history of any fall or trauma.    Objective:   Past Medical History:   Diagnosis Date    ADHD, predominantly inattentive type     Anxiety     Cholelithiasis 2018    Depression     Elevated liver function tests     Insomnia     Microhematuria 2017    Mood swings     SEASONAL ALLERGIES               Past Surgical History:   Procedure Laterality Date    HERNIA SURGERY Left 2018    inguinal    IMPACT TOOTH REM BONY W/COMP Bilateral     wisdom teeth x 4    VASECTOMY Bilateral 2016                Social History     Socioeconomic History    Marital status:      Spouse name: Jina    Number of children: 2    Years of education: 16   Occupational History    Occupation:      Comment: [employer]   Tobacco Use    Smoking status: Former     Packs/day: 0.75     Years: 14.00     Additional pack years: 0.00     Total pack years: 10.50     Types: Cigarettes     Quit date: 3/31/2009     Years since quittin.8    Smokeless tobacco: Never   Vaping Use     Vaping Use: Never used   Substance and Sexual Activity    Alcohol use: No    Drug use: No    Sexual activity: Yes     Partners: Female   Other Topics Concern     Service No    Blood Transfusions No    Caffeine Concern Yes     Comment: cola 1/day    Occupational Exposure No    Hobby Hazards No    Sleep Concern No    Stress Concern No    Weight Concern No    Special Diet No    Back Care No    Exercise Yes     Comment: walks 3 mile/day    Bike Helmet No    Seat Belt Yes    Self-Exams Yes   Social History Narrative    Lives with wife and children.    Enjoys woodworking, home improvement              Review of Systems    Positive for stated complaint: RUQ PAIN  Other systems are as noted in HPI.  Constitutional and vital signs reviewed.      All other systems reviewed and negative except as noted above.    Physical Exam     ED Triage Vitals [02/10/24 1141]   BP (!) 149/92   Pulse 98   Resp 16   Temp 98.6 °F (37 °C)   Temp src Temporal   SpO2 97 %   O2 Device None (Room air)       Current:BP (!) 137/97   Pulse 99   Temp 98.6 °F (37 °C) (Temporal)   Resp 16   Ht 175.3 cm (5' 9\")   Wt 70.3 kg   SpO2 97%   BMI 22.89 kg/m²         Physical Exam    GENERAL: Well-developed, well-nourished male sitting up breathing easily in no apparent distress.  Patient is nontoxic in appearance.  HEENT: Head is normocephalic, atraumatic. Pupils are 4 mm equally round and reactive to light. Oropharynx is clear. Mucous membranes are moist.  LUNGS: Clear to auscultation bilaterally with no wheeze. There is good equal air entry bilaterally.  HEART: Regular rate and rhythm. Normal S1, S2 no S3, or S4. No murmur.  ABDOMEN: There is moderate focal tenderness to palpation appreciated to the right upper and mid abdomen with no other focal tenderness to palpation appreciated. There is mild voluntary guarding, no rebound, no mass, and no organomegaly appreciated. There is normoactive bowel sounds.   EXTREMITIES: There is no cyanosis,  clubbing, or edema appreciated. Pulses are 2+ and equal in all 4 extremities.  NEURO: Patient is awake, alert and oriented to time place and person. Motor strength is 5 over 5 in all 4 extremities. There are no gross motor or sensory deficits appreciated.  Patient is up and ambulatory and is answering all questions appropriately.      ED Course     Labs Reviewed   COMP METABOLIC PANEL (14) - Abnormal; Notable for the following components:       Result Value    AST 12 (*)     All other components within normal limits   URINALYSIS WITH CULTURE REFLEX - Abnormal; Notable for the following components:    Blood Urine 3+ (*)     Protein Urine 30 (*)     Urobilinogen Urine 2 (*)     RBC Urine >10 (*)     All other components within normal limits   CBC W/ DIFFERENTIAL - Abnormal; Notable for the following components:    WBC 13.4 (*)     Neutrophil Absolute Prelim 10.18 (*)     Neutrophil Absolute 10.18 (*)     Monocyte Absolute 1.40 (*)     All other components within normal limits   LIPASE - Normal   CBC WITH DIFFERENTIAL WITH PLATELET    Narrative:     The following orders were created for panel order CBC With Differential With Platelet.  Procedure                               Abnormality         Status                     ---------                               -----------         ------                     CBC W/ DIFFERENTIAL[[id number]]          Abnormal            Final result                 Please view results for these tests on the individual orders.   I personally reviewed the patient's CT scan of the abdomen pelvis my individual interpretation shows no evidence of any acute bowel obstruction or free air.  I also reviewed the official radiology report which showed results as noted below.          CT ABDOMEN+PELVIS(CONTRAST ONLY)(CPT=74177)    Result Date: 2/10/2024  CONCLUSION:   1. Cholelithiasis with mild gallbladder wall thickening, gallbladder distension, and minimal pericholecystic fluid is concerning for  changes of early acute cholecystitis.  Clinical correlation recommended.  Dedicated abdominal ultrasound may also be of further value.  Nonspecific haziness in the right-sided omentum may be due to the edema from this inflammatory process.  2. Left nephrolithiasis.  No obstructive uropathy.   Please see above for further details.   LOCATION:  [facility]   Dictated by (CST): Todd Kirkpatrick MD on 2/10/2024 at 1:26 PM     Finalized by (CST): Todd Kirkpatrick MD on 2/10/2024 at 1:29 PM               MDM      Patient IV line established blood work drawn including CBC, chemistries, BUN/creatinine, and blood sugar showed evidence of elevated white blood count 13.4.  Electrolytes are unremarkable.  Liver function test and lipase found to be negative.  Urinalysis does show evidence of some blood.  Differential diagnosis considered myself includes acute cholelithiasis, acute pancreatitis, acute ureterolithiasis, acute appendicitis.  Patient was given IV fluid IV pain medication in the emergency room.  CT scan shows no evidence of cholelithiasis with evidence of acute early cholecystitis.  Patient was given IV Zosyn after discussion with Dr. Christian.  The patient will be admitted to the hospital for further care at this time.  Patient will be kept n.p.o. at this time.  The patient has not anything to eat or drink throughout the day today.  Patient's case discussed with Dr. [facility] and the patient will be admitted for further care at this time.  Patient admitted with no further new complaints.  Admission disposition: 2/10/2024  1:54 PM                                        Medical Decision Making      Disposition and Plan     Clinical Impression:  1. Acute cholecystitis         Disposition:  Admit  2/10/2024  1:54 pm    Follow-up:  No follow-up provider specified.        Medications Prescribed:  Current Discharge Medication List                            Hospital Problems       Present on Admission  Date Reviewed: 10/25/2021             ICD-10-CM Noted POA    * (Principal) Acute cholecystitis K81.0 2/10/2024 Unknown

## 2024-02-11 VITALS
HEART RATE: 92 BPM | TEMPERATURE: 99 F | OXYGEN SATURATION: 95 % | RESPIRATION RATE: 18 BRPM | DIASTOLIC BLOOD PRESSURE: 84 MMHG | WEIGHT: 155 LBS | HEIGHT: 69 IN | BODY MASS INDEX: 22.96 KG/M2 | SYSTOLIC BLOOD PRESSURE: 142 MMHG

## 2024-02-11 RX ORDER — AMOXICILLIN AND CLAVULANATE POTASSIUM 875; 125 MG/1; MG/1
1 TABLET, FILM COATED ORAL 2 TIMES DAILY
Qty: 14 TABLET | Refills: 0 | Status: SHIPPED | OUTPATIENT
Start: 2024-02-11 | End: 2024-02-21

## 2024-02-11 RX ORDER — TRAMADOL HYDROCHLORIDE 50 MG/1
50 TABLET ORAL EVERY 6 HOURS PRN
Qty: 30 TABLET | Refills: 0 | Status: SHIPPED | OUTPATIENT
Start: 2024-02-11 | End: 2024-02-21

## 2024-02-11 NOTE — PLAN OF CARE
Problem: Patient/Family Goals  Goal: Patient/Family Long Term Goal  Description: Patient's Long Term Goal: Discharge home    Interventions:  - IVF   - IV abx  - advance diet as tolerated  - See additional Care Plan goals for specific interventions  Outcome: Progressing  Goal: Patient/Family Short Term Goal  Description: Patient's Short Term Goal: Tolerate diet    Interventions:   - advance diet as tolerated  - ambulate as tolerated  - See additional Care Plan goals for specific interventions  Outcome: Progressing

## 2024-02-11 NOTE — DISCHARGE SUMMARY
General Medicine Discharge Summary     Patient ID:  Moshe Nagy  48 year old  10/13/1975    Admit date: 2/10/2024    Discharge date and time: 2/11/2024    Attending Physician: Jonn Crhistian MD     Primary Care Physician: Gil Garrison MD     Discharge Diagnoses: Acute cholecystitis [K81.0]    Primary Diagnosis at discharge from Hospital: Other: acute cholecystitis; still recommend for TCM follow-up    Please note that only [facility] DMG and EMG patients enrolled in the Medicare ACO, BCBS ACO and [facility] HMOs will be handled by the [facility] Care Management team.  For all other patients, please follow usual protocol for discharge care transition.    Secondary Diagnoses: None    Risk of readmission: Moshe Nagy has Moderate Risk of readmission after discharge from the hospital.    Discharge Condition: stable    Disposition: home    Important Follow up:  - PCP within   - Consults:     Jonn Christian MD  [address] [place], [place] [address]  [address]  [facility] [address]  [phone]    Follow up in 2 week(s)      - Labs: none  - Radiology: none    Hospital Course:          Pt admitted with acute cholecystitis.  Sp lap sania. Did well and toelrating diet day of dc.  Augmentin for home.      Consults: IP CONSULT TO GENERAL SURGERY  IP CONSULT TO HOSPITALIST  IP CONSULT TO RESPIRATORY CARE    Operative Procedures: Procedure(s) (LRB):  XI ROBOT-ASSISTED LAPAROSCOPIC CHOLECYSTECTOMY (N/A)       Patient instructions:      Current Discharge Medication List        START taking these medications    Details   amoxicillin clavulanate 875-125 MG Oral Tab Take 1 tablet by mouth 2 (two) times daily for 10 days.      traMADol 50 MG Oral Tab Take 1 tablet (50 mg total) by mouth every 6 (six) hours as needed for Pain.           CONTINUE these medications which have NOT CHANGED    Details   ibuprofen 400 MG Oral Tab Take 1 tablet (400 mg total) by mouth every 6 (six) hours as needed for Pain.       sertraline 100 MG Oral Tab Take 1 tablet (100 mg total) by mouth at bedtime.      !! buPROPion  MG Oral Tablet 24 Hr Take 1 tablet (300 mg total) by mouth daily.      !! buPROPion HCl ER, XL, 150 MG Oral Tablet 24 Hr       lamoTRIgine 200 MG Oral Tab Take 1 tablet (200 mg total) by mouth daily.      ALPRAZolam 0.25 MG Oral Tab Take 1 tablet (0.25 mg total) by mouth nightly as needed.       !! - Potential duplicate medications found. Please discuss with provider.        STOP taking these medications       VYVANSE 60 MG Oral Cap              I PERSONALLY RECONCILED CURRENT AND DISCHARGE MEDICATIONS ON DAY OF DISCHARGE      Activity: activity as tolerated  Diet:  low fiber  Wound Care: as directed  Code Status: No Order      Exam on day of discharge:     Vitals:    02/11/24 0757   BP: 142/84   Pulse: 92   Resp: 18   Temp: 99.1 °F (37.3 °C)       General: no acute distress, alert and oriented x 3  Heart: RRR  Lungs: clear bilaterally, no active wheezing  Abdomen: nontender, nondistended, intact BS  Extremities: no pedal edema   Neuro: CN inact, no focal deficits      Total time coordinating care for discharge: Greater than 30 minutes    .Howie [facility]  [facility] [place] [place] Hospitalist  [phone]

## 2024-02-11 NOTE — PROGRESS NOTES
[facility]  Progress Note    Moshe Nagy Patient Status:  Inpatient    10/13/1975 MRN [id number]   Location [facility] 3NW-A Attending Jonn Christian MD   Hosp Day # 1 PCP Gil Garrison MD     Subjective:  Doing well.  Feels much better.  Taking clears    Objective/Physical Exam:  General: Alert, orientated x3.  Cooperative.  No apparent distress.  Vital Signs:  Blood pressure 142/84, pulse 92, temperature 99.1 °F (37.3 °C), temperature source Oral, resp. rate 18, height 5' 9\" (1.753 m), weight 155 lb (70.3 kg), SpO2 95%.  HEENT: Exam is unremarkable.  Without scleral icterus.  Mucous membranes are moist. Oropharynx is clear.  Lungs: Clear to auscultation bilaterally.  Cardiac: Regular rate and rhythm. No murmur.  Abdomen: Soft, nondistended much less tender  Extremities:  No lower extremity edema noted.  Without clubbing or cyanosis.    Skin: Normal texture and turgor.  Neurologic: Cranial nerves are grossly intact.  Motor strength and sensory examination is grossly normal.  No focal neurologic deficit.    Labs:  Lab Results   Component Value Date    WBC 13.4 02/10/2024    HGB 14.7 02/10/2024    HCT 44.4 02/10/2024    .0 02/10/2024    CREATSERUM 0.79 02/10/2024    BUN 13 02/10/2024     02/10/2024    K 3.8 02/10/2024     02/10/2024    CO2 29.0 02/10/2024    GLU 98 02/10/2024    CA 9.1 02/10/2024    ALB 3.7 02/10/2024    ALKPHO 85 02/10/2024    BILT 1.3 02/10/2024    TP 7.3 02/10/2024    AST 12 02/10/2024    ALT 17 02/10/2024    LIP 34 02/10/2024       Assessment/Plan:  Stable course following robotic assisted laparoscopic cholecystectomy yesterday.  Will advance diet.  Probable home later today.  Will send home on oral antibiotics and pain control    Jonn Christian MD  2024  8:30 AM

## 2024-02-11 NOTE — DISCHARGE INSTRUCTIONS
The patient returns after undergoing a laparoscopic cholecystectomy. Overall the patient is doing well. The pain is manageable and the need for oral narcotics is decreasing. The patient is tolerating a po diet and having normal bowel movements.    The exam reveals the patient to be nonicteric. The abdomen is soft, nontender and the wounds are healing without signs of infection.     The patients preoperative pain has resolved. I have asked the patient to follow up with me on a prn basis.

## 2024-02-11 NOTE — PROGRESS NOTES
NURSING DISCHARGE NOTE    Discharged Home via Wheelchair.  Accompanied by  none  Belongings Taken by patient/family.    Patient given discharge instructions. Instructed to follow-up w/ surgery in 2 weeks. Instructed to  prescription meds from pharmacy. Patient verbalized understanding. IV removed and intact. All questions and concerns addressed at this time.

## 2024-02-11 NOTE — PROGRESS NOTES
NURSING ADMISSION NOTE      Patient admitted via  Bed  Oriented to room.  Safety precautions initiated.  Bed in low position.  Call light in reach.    Patient arrived to floor in stable condition.

## 2024-02-11 NOTE — PROGRESS NOTES
Pt is alert and oriented, vss on room air. Tolerating diet without nausea. Mild surgical pain, prn Toradol as per mar. Abdomen is soft, hypoactive bowel sounds.Voids adequately. Incision sites on abdomen, lapx4 with dermabond, cdi. IVF infusing, zosyn as scheduled, Poc discussed, call light in reach

## 2024-02-13 NOTE — PAYOR COMM NOTE
--------------  ADMISSION REVIEW     Payor: [employer] POS/MCNP  Subscriber #:  [id number]  Authorization Number: [id number]    Admit date: 2/10/24  Admit time:  6:19 PM       REVIEW DOCUMENTATION:     ED Provider Notes        ED Provider Notes signed by Henri Ruiz MD at 2/10/2024  2:35 PM       Author: Henri Ruiz MD Service: -- Author Type: Physician    Filed: 2/10/2024  2:35 PM Date of Service: 2/10/2024 11:58 AM Status: Signed    : Henri Ruiz MD (Physician)           Patient Seen in: [facility] Emergency Department      History     Chief Complaint   Patient presents with    Abdomen/Flank Pain     Stated Complaint: RUQ PAIN    Subjective:   HPI    Patient is a 48-year-old male with previous hernia surgery with a questionable history of some gallstones in the past as per medical records who presents emergency room with a history of some right-sided abdominal pain which has been ongoing since Tuesday, 4 days prior to arrival in the emergency room.  The patient states he was concerned he might be constipated took Gas-X and stool softener as well as a suppository yesterday and had normal bowel movement last night.  Patient continues to have pain to the right side of his abdomen at this time.  The patient was sent to the ER for further evaluation.  Patient denies history of any vomiting or fever.  The patient denies history of any other somatic complaints or discomfort at this time.  Patient denies history of any fall or trauma.    Objective:   Past Medical History:   Diagnosis Date    ADHD, predominantly inattentive type     Anxiety     Cholelithiasis 2018    Depression     Elevated liver function tests     Insomnia     Microhematuria 2017    Mood swings     SEASONAL ALLERGIES               Past Surgical History:   Procedure Laterality Date    HERNIA SURGERY Left 08/16/2018    inguinal    IMPACT TOOTH REM BONY W/COMP Bilateral 1994    wisdom teeth x 4    VASECTOMY Bilateral 12/02/2016                 Social History     Socioeconomic History    Marital status:      Spouse name: Jina    Number of children: 2    Years of education: 16   Occupational History    Occupation:      Comment: [employer]   Tobacco Use    Smoking status: Former     Packs/day: 0.75     Years: 14.00     Additional pack years: 0.00     Total pack years: 10.50     Types: Cigarettes     Quit date: 3/31/2009     Years since quittin.8    Smokeless tobacco: Never   Vaping Use    Vaping Use: Never used   Substance and Sexual Activity    Alcohol use: No    Drug use: No    Sexual activity: Yes     Partners: Female   Other Topics Concern     Service No    Blood Transfusions No    Caffeine Concern Yes     Comment: cola 1/day    Occupational Exposure No    Hobby Hazards No    Sleep Concern No    Stress Concern No    Weight Concern No    Special Diet No    Back Care No    Exercise Yes     Comment: walks 3 mile/day    Bike Helmet No    Seat Belt Yes    Self-Exams Yes   Social History Narrative    Lives with wife and children.    Enjoys woodworking, home improvement              Review of Systems    Positive for stated complaint: RUQ PAIN  Other systems are as noted in HPI.  Constitutional and vital signs reviewed.      All other systems reviewed and negative except as noted above.    Physical Exam     ED Triage Vitals [02/10/24 1141]   BP (!) 149/92   Pulse 98   Resp 16   Temp 98.6 °F (37 °C)   Temp src Temporal   SpO2 97 %   O2 Device None (Room air)       Current:BP (!) 137/97   Pulse 99   Temp 98.6 °F (37 °C) (Temporal)   Resp 16   Ht 175.3 cm (5' 9\")   Wt 70.3 kg   SpO2 97%   BMI 22.89 kg/m²         Physical Exam    GENERAL: Well-developed, well-nourished male sitting up breathing easily in no apparent distress.  Patient is nontoxic in appearance.  HEENT: Head is normocephalic, atraumatic. Pupils are 4 mm equally round and reactive to light. Oropharynx is clear. Mucous membranes are  moist.  LUNGS: Clear to auscultation bilaterally with no wheeze. There is good equal air entry bilaterally.  HEART: Regular rate and rhythm. Normal S1, S2 no S3, or S4. No murmur.  ABDOMEN: There is moderate focal tenderness to palpation appreciated to the right upper and mid abdomen with no other focal tenderness to palpation appreciated. There is mild voluntary guarding, no rebound, no mass, and no organomegaly appreciated. There is normoactive bowel sounds.   EXTREMITIES: There is no cyanosis, clubbing, or edema appreciated. Pulses are 2+ and equal in all 4 extremities.  NEURO: Patient is awake, alert and oriented to time place and person. Motor strength is 5 over 5 in all 4 extremities. There are no gross motor or sensory deficits appreciated.  Patient is up and ambulatory and is answering all questions appropriately.      ED Course     Labs Reviewed   COMP METABOLIC PANEL (14) - Abnormal; Notable for the following components:       Result Value    AST 12 (*)     All other components within normal limits   URINALYSIS WITH CULTURE REFLEX - Abnormal; Notable for the following components:    Blood Urine 3+ (*)     Protein Urine 30 (*)     Urobilinogen Urine 2 (*)     RBC Urine >10 (*)     All other components within normal limits   CBC W/ DIFFERENTIAL - Abnormal; Notable for the following components:    WBC 13.4 (*)     Neutrophil Absolute Prelim 10.18 (*)     Neutrophil Absolute 10.18 (*)     Monocyte Absolute 1.40 (*)     All other components within normal limits   LIPASE - Normal   CBC WITH DIFFERENTIAL WITH PLATELET    Narrative:     The following orders were created for panel order CBC With Differential With Platelet.  Procedure                               Abnormality         Status                     ---------                               -----------         ------                     CBC W/ DIFFERENTIAL[[id number]]          Abnormal            Final result                 Please view results for these  tests on the individual orders.   I personally reviewed the patient's CT scan of the abdomen pelvis my individual interpretation shows no evidence of any acute bowel obstruction or free air.  I also reviewed the official radiology report which showed results as noted below.          CT ABDOMEN+PELVIS(CONTRAST ONLY)(CPT=74177)    Result Date: 2/10/2024  CONCLUSION:   1. Cholelithiasis with mild gallbladder wall thickening, gallbladder distension, and minimal pericholecystic fluid is concerning for changes of early acute cholecystitis.  Clinical correlation recommended.  Dedicated abdominal ultrasound may also be of further value.  Nonspecific haziness in the right-sided omentum may be due to the edema from this inflammatory process.  2. Left nephrolithiasis.  No obstructive uropathy.   Please see above for further details.   LOCATION:  [facility]   Dictated by (CST): Todd Kirkpatrick MD on 2/10/2024 at 1:26 PM     Finalized by (CST): Todd Kirkpatrick MD on 2/10/2024 at 1:29 PM              MDM      Patient IV line established blood work drawn including CBC, chemistries, BUN/creatinine, and blood sugar showed evidence of elevated white blood count 13.4.  Electrolytes are unremarkable.  Liver function test and lipase found to be negative.  Urinalysis does show evidence of some blood.  Differential diagnosis considered myself includes acute cholelithiasis, acute pancreatitis, acute ureterolithiasis, acute appendicitis.  Patient was given IV fluid IV pain medication in the emergency room.  CT scan shows no evidence of cholelithiasis with evidence of acute early cholecystitis.  Patient was given IV Zosyn after discussion with Dr. Christian.  The patient will be admitted to the hospital for further care at this time.  Patient will be kept n.p.o. at this time.  The patient has not anything to eat or drink throughout the day today.  Patient's case discussed with Dr. [facility] and the patient will be admitted for further care at this  time.  Patient admitted with no further new complaints.  Admission disposition: 2/10/2024  1:54 PM                                        Medical Decision Making      Disposition and Plan     Clinical Impression:  1. Acute cholecystitis         Disposition:  Admit  2/10/2024  1:54 pm    Follow-up:  No follow-up provider specified.        Medications Prescribed:  Current Discharge Medication List                            Hospital Problems       Present on Admission  Date Reviewed: 10/25/2021            ICD-10-CM Noted POA    * (Principal) Acute cholecystitis K81.0 2/10/2024 Unknown                     Signed by eHnri Ruiz MD on 2/10/2024  2:35 PM       [facility] Hospitalist History and Physical    Assessment/Plan:         # acute cholecytitis  - plan lap sania today, IVF, NPO, pain control, abx  Impression and Plan:  Acute cholecystitis with cholelithiasis     I have recommended a ROBOTIC ASSISTED laparoscopic cholecystectomy. The risks benefits and alternatives of this approach were explained to the patient in detail. We discussed the risk of bleeding, infection, need to convert to an open operation, bile leak, and injury to the surrounding structures. Despite all the risk patient wished to proceed with surgery.   2/11  Assessment/Plan:  Stable course following robotic assisted laparoscopic cholecystectomy yesterday.  Will advance diet.       MEDICATIONS ADMINISTERED IN LAST 1 DAY:  Administration History       None            Vitals (last day) before discharge       Date/Time Temp Pulse Resp BP SpO2 Weight O2 Device O2 Flow Rate (L/min) [facility]    02/11/24 0600 98.8 °F (37.1 °C) 97 18 157/95 94 % -- None (Room air) --     02/11/24 0015 98.4 °F (36.9 °C) 90 18 156/96 96 % -- None (Room air) --     02/10/24 2104 98.5 °F (36.9 °C) 85 18 159/96 100 % -- None (Room air) --     02/10/24 1821 98.4 °F (36.9 °C) 94 18 134/89 99 % -- Nasal cannula 2 L/min     02/10/24 1800 98.9 °F (37.2 °C) 94 18 136/91 98 % -- -- 2  L/min LG    02/10/24 1745 -- 89 17 136/89 98 % -- -- 3 L/min LG    02/10/24 1720 -- 83 16 123/83 96 % -- Nasal cannula 4 L/min     02/10/24 1500 -- 102 -- 133/92 99 % -- None (Room air) -- AT    02/10/24 1400 -- 99 -- 137/97 97 % -- None (Room air) -- AT    02/10/24 1141 98.6 °F (37 °C) 98 16 149/92 97 % 155 lb None (Room air) -- SK          CIWA Scores (last 2 days) before discharge       None            PLEASE FAX DAYS CERTIFIED AND NEXT REVIEW DATE [phone]

## 2024-02-14 NOTE — PAYOR COMM NOTE
--------------  DISCHARGE REVIEW    Payor: [facility] POS/MCNP  Subscriber #:  [id number]  Authorization Number: [id number]    Admit date: 2/10/24  Admit time:   6:19 PM  Discharge Date: 2/11/2024 12:43 PM     Admitting Physician: [facility] MD  Attending Physician:  No att. providers found  Primary Care Physician: Gil Garrison MD          Discharge Summary Notes        Discharge Summary signed by [facility] MD at 2/11/2024 11:42 AM       Author: [facility] MD Specialty: HOSPITALIST, Internal Medicine Author Type: Physician    Filed: 2/11/2024 11:42 AM Date of Service: 2/11/2024 11:41 AM Status: Signed    : [facility] MD (Physician)                                                              General Medicine Discharge Summary     Patient ID:  Moshe Nagy  48 year old  10/13/1975    Admit date: 2/10/2024    Discharge date and time: 2/11/2024    Attending Physician: Jonn Christian MD     Primary Care Physician: Gil Garrison MD     Discharge Diagnoses: Acute cholecystitis [K81.0]    Primary Diagnosis at discharge from Hospital: Other: acute cholecystitis; still recommend for TCM follow-up    Please note that only [facility] DMG and EMG patients enrolled in the Medicare ACO, [facility] ACO and [facility] HMOs will be handled by the [facility] Care Management team.  For all other patients, please follow usual protocol for discharge care transition.    Secondary Diagnoses: None    Risk of readmission: Moshe Nagy has Moderate Risk of readmission after discharge from the hospital.    Discharge Condition: stable    Disposition: home    Important Follow up:  - PCP within   - Consults:     Jonn Christian MD  [facility] [address]  [address]  [facility] [address]  [phone]    Follow up in 2 week(s)      - Labs: none  - Radiology: none    Hospital Course:          Pt admitted with acute cholecystitis.  Sp lap sania. Did well and toelrating diet day of dc.  Augmentin for home.       Consults: IP CONSULT TO GENERAL SURGERY  IP CONSULT TO HOSPITALIST  IP CONSULT TO RESPIRATORY CARE    Operative Procedures: Procedure(s) (LRB):  XI ROBOT-ASSISTED LAPAROSCOPIC CHOLECYSTECTOMY (N/A)       Patient instructions:      Current Discharge Medication List        START taking these medications    Details   amoxicillin clavulanate 875-125 MG Oral Tab Take 1 tablet by mouth 2 (two) times daily for 10 days.      traMADol 50 MG Oral Tab Take 1 tablet (50 mg total) by mouth every 6 (six) hours as needed for Pain.           CONTINUE these medications which have NOT CHANGED    Details   ibuprofen 400 MG Oral Tab Take 1 tablet (400 mg total) by mouth every 6 (six) hours as needed for Pain.      sertraline 100 MG Oral Tab Take 1 tablet (100 mg total) by mouth at bedtime.      !! buPROPion  MG Oral Tablet 24 Hr Take 1 tablet (300 mg total) by mouth daily.      !! buPROPion HCl ER, XL, 150 MG Oral Tablet 24 Hr       lamoTRIgine 200 MG Oral Tab Take 1 tablet (200 mg total) by mouth daily.      ALPRAZolam 0.25 MG Oral Tab Take 1 tablet (0.25 mg total) by mouth nightly as needed.       !! - Potential duplicate medications found. Please discuss with provider.        STOP taking these medications       VYVANSE 60 MG Oral Cap              I PERSONALLY RECONCILED CURRENT AND DISCHARGE MEDICATIONS ON DAY OF DISCHARGE      Activity: activity as tolerated  Diet:  low fiber  Wound Care: as directed  Code Status: No Order      Exam on day of discharge:     Vitals:    02/11/24 0757   BP: 142/84   Pulse: 92   Resp: 18   Temp: 99.1 °F (37.3 °C)       General: no acute distress, alert and oriented x 3  Heart: RRR  Lungs: clear bilaterally, no active wheezing  Abdomen: nontender, nondistended, intact BS  Extremities: no pedal edema   Neuro: CN inact, no focal deficits      Total time coordinating care for discharge: Greater than 30 minutes    .Howie HYMAN Hospitalist  [phone]      Electronically signed by  [facility] MD on 2/11/2024 11:42 AM         REVIEWER COMMENTS

## (undated) DEVICE — DRAPE TOP 102X53IN ABSRB REINF HK AND LOOP LN

## (undated) DEVICE — NEEDLE HYPO 18X1-1/2

## (undated) DEVICE — PUMP SUC IRR TBNG L10FT W/ HNDPC ASSEMB

## (undated) DEVICE — APPLIER CLP L33.27CM JAW L2.4CM DIA8MM 55DEG

## (undated) DEVICE — Device

## (undated) DEVICE — SUTURE PDS II SZ 0 L27IN ABSRB VLT L26MM CT-2

## (undated) DEVICE — SYSTEM ACCS L100MM DIA5MM ABD 1ST ENTRY Z

## (undated) DEVICE — SUTURE MCRYL SZ 4-0 L18IN ABSRB UD L19MM PS-2

## (undated) DEVICE — SYSTEM POS W20XH1XL29IN PT PIGAZZI

## (undated) DEVICE — LAPAROVUE VISIBILITY SYS

## (undated) DEVICE — CLIP INT L POLYMER LOK LIG HEM O LOK

## (undated) DEVICE — ROBOTIC GENERAL CUSTOM PK

## (undated) DEVICE — OBTURATOR ROBOTIC DIA8MM STD OPT BLDELSS

## (undated) DEVICE — ADHESIVE SKIN TOP FOR WND CLSR DERMBND ADV

## (undated) DEVICE — DRAPE EQUIP CLMN ROBOTIC DISP DA VINCI XI

## (undated) DEVICE — SYSTEM IRRISEPT WND IRR 0.05%

## (undated) DEVICE — DRAPE EXT W21XH19XL10.5IN DA VINCI XI

## (undated) DEVICE — SURGICAL GLOVE PI ORTHO 8

## (undated) DEVICE — NEEDLE INSUF 13GA L120MM LAP SPR LD BLNT STYL

## (undated) DEVICE — LIGHT HANDLE

## (undated) DEVICE — COVER WAND CLR RF DTECT RF ASSURE

## (undated) DEVICE — SEAL ROBOTIC W10.375XH3.75XL4.3IN 0.88LB

## (undated) DEVICE — SUTURE VCRL SZ 0 L27IN ABSRB VLT L26MM CT-2

## (undated) NOTE — LETTER
[address]  [address]  Authorization for Surgical Operation and Procedure     Date:___________                                                                                                         Time:__________  I hereby authorize Surgeon(s):  oJnn Christian MD, my physician and his/her assistants (if applicable), which may include medical students, residents, and/or fellows, to perform the following surgical operation/ procedure and administer such anesthesia as may be determined necessary by my physician:  Operation/Procedure name (s) Procedure(s):  XI ROBOT-ASSISTED LAPAROSCOPIC CHOLECYSTECTOMY POSS OPEN on Moshe Nagy   2.   I recognize that during the surgical operation/procedure, unforeseen conditions may necessitate additional or different procedures than those listed above.  I, therefore, further authorize and request that the above-named surgeon, assistants, or designees perform such procedures as are, in their judgment, necessary and desirable.    3.   My surgeon/physician has discussed prior to my surgery the potential benefits, risks and side effects of this procedure; the likelihood of achieving goals; and potential problems that might occur during recuperation.  They also discussed reasonable alternatives to the procedure, including risks, benefits, and side effects related to the alternatives and risks related to not receiving this procedure.  I have had all my questions answered and I acknowledge that no guarantee has been made as to the result that may be obtained.    4.   Should the need arise during my operation/procedure, which includes change of level of care prior to discharge, I also consent to the administration of blood and/or blood products.  Further, I understand that despite careful testing and screening of blood or blood products by collecting agencies, I may still be subject to ill effects as a result of receiving a blood transfusion  and/or blood products.  The following are some, but not all, of the potential risks that can occur: fever and allergic reactions, hemolytic reactions, transmission of diseases such as Hepatitis, AIDS and Cytomegalovirus (CMV) and fluid overload.  In the event that I wish to have an autologous transfusion of my own blood, or a directed donor transfusion, I will discuss this with my physician.  Check only if Refusing Blood or Blood Products  I understand refusal of blood or blood products as deemed necessary by my physician may have serious consequences to my condition to include possible death. I hereby assume responsibility for my refusal and release the hospital, its personnel, and my physicians from any responsibility for the consequences of my refusal.          o  Refuse      5.   I authorize the use of any specimen, organs, tissues, body parts or foreign objects that may be removed from my body during the operation/procedure for diagnosis, research or teaching purposes and their subsequent disposal by hospital authorities.  I also authorize the release of specimen test results and/or written reports to my treating physician on the hospital medical staff or other referring or consulting physicians involved in my care, at the discretion of the Pathologist or my treating physician.    6.   I consent to the photographing or videotaping of the operations or procedures to be performed, including appropriate portions of my body for medical, scientific, or educational purposes, provided my identity is not revealed by the pictures or by descriptive texts accompanying them.  If the procedure has been photographed/videotaped, the surgeon will obtain the original picture, image, videotape or CD.  The hospital will not be responsible for storage, release or maintenance of the picture, image, tape or CD.    7.   I consent to the presence of a  or observers in the operating room as deemed necessary by my  physician or their designees.    8.   I recognize that in the event my procedure results in extended X-Ray/fluoroscopy time, I may develop a skin reaction.    9. If I have a Do Not Attempt Resuscitation (DNAR) order in place, that status will be suspended while in the operating room, procedural suite, and during the recovery period unless otherwise explicitly stated by me (or a person authorized to consent on my behalf). The surgeon or my attending physician will determine when the applicable recovery period ends for purposes of reinstating the DNAR order.  10. Patients having a sterilization procedure: I understand that if the procedure is successful the results will be permanent and it will therefore be impossible for me to inseminate, conceive, or bear children.  I also understand that the procedure is intended to result in sterility, although the result has not been guaranteed.   11. I acknowledge that my physician has explained sedation/analgesia administration to me including the risk and benefits I consent to the administration of sedation/analgesia as may be necessary or desirable in the judgment of my physician.    I CERTIFY THAT I HAVE READ AND FULLY UNDERSTAND THE ABOVE CONSENT TO OPERATION and/or OTHER PROCEDURE.    _________________________________________  __________________________________  Signature of Patient     Signature of Responsible Person         ___________________________________         Printed Name of Responsible Person           _________________________________                 Relationship to Patient  _________________________________________  ______________________________  Signature of Witness          Date  Time      Patient Name: Moshe Nagy     : 10/13/1975                 Printed: February 10, 2024     Medical Record #: [id number]                     Page [address] of [address] [place] [address]  [id number]    Consent for  Anesthesia    I, John Yakov agree to be cared for by an anesthesiologist, who is specially trained to monitor me and give me medicine to put me to sleep or keep me comfortable during my procedure    I understand that my anesthesiologist is not an employee or agent of [facility] [employer]. He or she works for [employer].    As the patient asking for anesthesia services, I agree to:  Allow the anesthesiologist (anesthesia doctor) to give me medicine and do additional procedures as necessary. Some examples are: Starting or using an “IV” to give me medicine, fluids or blood during my procedure, and having a breathing tube placed to help me breathe when I’m asleep (intubation). In the event that my heart stops working properly, I understand that my anesthesiologist will make every effort to sustain my life, unless otherwise directed by [facility] Do Not Resuscitate documents.  Tell my anesthesia doctor before my procedure:  If I am pregnant.  The last time that I ate or drank.  All of the medicines I take (including prescriptions, herbal supplements, and pills I can buy without a prescription (including street drugs/illegal medications). Failure to inform my anesthesiologist about these medicines may increase my risk of anesthetic complications.  If I am allergic to anything or have had a reaction to anesthesia before.  I understand how the anesthesia medicine will help me (benefits).  I understand that with any type of anesthesia medicine there are risks:  The most common risks are: nausea, vomiting, sore throat, muscle soreness, damage to my eyes, mouth, or teeth (from breathing tube placement).  Rare risks include: remembering what happened during my procedure, allergic reactions to medications, injury to my airway, heart, lungs, vision, nerves, or muscles and in extremely rare instances death.  My doctor has explained to me other choices available to me for my care  (alternatives).  Pregnant Patients (“epidural”):  I understand that the risks of having an epidural (medicine given into my back to help control pain during labor), include itching, low blood pressure, difficulty urinating, headache or slowing of the baby’s heart. Very rare risks include infection, bleeding, seizure, irregular heart rhythms and nerve injury.  Regional Anesthesia (“spinal”, “epidural”, & “nerve blocks”):  I understand that rare but potential complications include headache, bleeding, infection, seizure, irregular heart rhythms, and nerve injury.    I can change my mind about having anesthesia services at any time before I get the medicine.    _____________________________________________________________________________  Patient (or Representative) Signature/Relationship to Patient  Date   Time    _____________________________________________________________________________   Name (if used)    Language/Organization   Time    _____________________________________________________________________________  Anesthesiologist Signature     Date   Time  I have discussed the procedure and information above with the patient (or patient’s representative) and answered their questions. The patient or their representative has agreed to have anesthesia services.    _____________________________________________________________________________  Witness        Date   Time  I have verified that the signature is that of the patient or patient’s representative, and that it was signed before the procedure  Patient Name: Moshe Nagy     : 10/13/1975                 Printed: February 10, 2024     Medical Record #: [id number]                     Page 2 of 2